# Patient Record
(demographics unavailable — no encounter records)

---

## 2025-07-16 NOTE — HISTORY OF PRESENT ILLNESS
[Patient reported PAP Smear was normal] : Patient reported PAP Smear was normal [FreeTextEntry1] : 30 yr old LMP 7/7/25 female presents for initial visit for [facility] G[facility] exam. Pt c/o feeling very irritated when she is on her menses.   Pt reports she has PMS and irregular menses but managed with ParaGard IUD.  Pt reports she had Cholecystectomy in April 2023.  Pt has hx of two pregnancies, both delivered with c-sections.  Pt wants to Hormonal IUD placed.   [PapSmeardate] : 9/22

## 2025-07-16 NOTE — SIGNATURES
EMS
[TextEntry] :  I, Gus Burciaga, acted as a scribe on behalf of Dr. Omar Jaffe M.D. on 07/16/2025 .     All medical entries made by the scribe were at my Dr. Omar Jaffe M.D direction and personally dictated by me on  07/16/2025 . I have reviewed the chart and agree that the record accurately reflects my personal performance of the history, physical exam, assessment and plan. I have also personally directed, reviewed, and agreed with the chart.

## 2025-07-16 NOTE — PLAN
[FreeTextEntry1] : -PAP/HPV done today   -Discussed the removal of the ParaGard IUD  -Discussed different birth control options that could help her irritated mood  -Advised to make appt with  to remove ParaGard IUD

## 2025-07-16 NOTE — PHYSICAL EXAM
[Chaperoned Physical Exam] : A chaperone was present in the examining room during all aspects of the physical examination. [MA] : MA [Appropriately responsive] : appropriately responsive [Alert] : alert [No Acute Distress] : no acute distress [No Lymphadenopathy] : no lymphadenopathy [Soft] : soft [Non-tender] : non-tender [Non-distended] : non-distended [No HSM] : No HSM [No Lesions] : no lesions [No Mass] : no mass [Oriented x3] : oriented x3 [Depressed Mood] : depressed mood [Examination Of The Breasts] : a normal appearance [No Masses] : no breast masses were palpable [Labia Majora] : normal [Labia Minora] : normal [Normal] : normal [Uterine Adnexae] : normal [FreeTextEntry2] : Karyna Garcia